# Patient Record
Sex: MALE | Race: WHITE | NOT HISPANIC OR LATINO | Employment: OTHER | URBAN - NONMETROPOLITAN AREA
[De-identification: names, ages, dates, MRNs, and addresses within clinical notes are randomized per-mention and may not be internally consistent; named-entity substitution may affect disease eponyms.]

---

## 2023-02-07 LAB
ALT SERPL-CCNC: 31 U/L (ref 10–52)
ANION GAP SERPL CALCULATED.3IONS-SCNC: 11 MMOL/L (ref 10–20)
AST SERPL-CCNC: 28 U/L (ref 9–39)
BICARBONATE: 30 MMOL/L (ref 21–32)
CALCIUM SERPL-MCNC: 9.4 MG/DL (ref 8.6–10.3)
CHLORIDE BLD-SCNC: 100 MMOL/L (ref 98–107)
CHOLESTEROL/HDL RATIO: 5.8
CHOLESTEROL: 192 MG/DL (ref 0–199)
CREAT SERPL-MCNC: 0.9 MG/DL (ref 0.5–1.3)
EGFR MALE: 89 ML/MIN/1.73M2
ERYTHROCYTE [DISTWIDTH] IN BLOOD BY AUTOMATED COUNT: 12.3 % (ref 11.5–14)
ESTIMATED AVERAGE GLUCOSE: 140 MG/DL
GLUCOSE: 130 MG/DL (ref 74–99)
HBA1C MFR BLD: 6.5 %
HCT VFR BLD CALC: 46.6 % (ref 41–52)
HDLC SERPL-MCNC: 33.1 MG/DL
HEMOGLOBIN: 16.1 G/DL (ref 13.5–17)
LDL CHOLESTEROL: 89 MG/DL (ref 0–99)
MCHC RBC AUTO-ENTMCNC: 34.5 G/DL (ref 32–36)
MCV RBC AUTO: 96 FL (ref 80–100)
NON-HDL CHOLESTEROL: 159 MG/DL
PLATELET # BLD: 205 X10E9/L (ref 150–450)
POTASSIUM SERPL-SCNC: 4.4 MMOL/L (ref 3.5–5.3)
RBC # BLD: 4.86 X10E12/L (ref 4.5–5.9)
SODIUM BLD-SCNC: 137 MMOL/L (ref 136–145)
TRIGL SERPL-MCNC: 352 MG/DL (ref 0–149)
UREA NITROGEN: 17 MG/DL (ref 6–23)
VLDLC SERPL CALC-MCNC: 70 MG/DL (ref 0–40)
WBC: 5.9 X10E9/L (ref 4.4–11.3)

## 2024-07-17 ENCOUNTER — OFFICE VISIT (OUTPATIENT)
Dept: URGENT CARE | Facility: CLINIC | Age: 74
End: 2024-07-17
Payer: MEDICARE

## 2024-07-17 VITALS
HEART RATE: 56 BPM | WEIGHT: 180 LBS | RESPIRATION RATE: 14 BRPM | TEMPERATURE: 98.7 F | HEIGHT: 70 IN | SYSTOLIC BLOOD PRESSURE: 136 MMHG | DIASTOLIC BLOOD PRESSURE: 80 MMHG | BODY MASS INDEX: 25.77 KG/M2 | OXYGEN SATURATION: 96 %
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
## 2024-07-17 DIAGNOSIS — S61.259A CAT BITE OF FINGER, INITIAL ENCOUNTER: Primary | ICD-10-CM

## 2024-07-17 PROCEDURE — 99213 OFFICE O/P EST LOW 20 MIN: CPT | Performed by: NURSE PRACTITIONER

## 2024-07-17 RX ORDER — DABIGATRAN ETEXILATE 150 MG/1
150 CAPSULE ORAL 2 TIMES DAILY
COMMUNITY
Start: 2024-07-01

## 2024-07-17 RX ORDER — PANTOPRAZOLE SODIUM 20 MG/1
20 TABLET, DELAYED RELEASE ORAL
COMMUNITY

## 2024-07-17 RX ORDER — PERPHENAZINE 2 MG
TABLET ORAL
COMMUNITY

## 2024-07-17 RX ORDER — EZETIMIBE 10 MG/1
10 TABLET ORAL
COMMUNITY
Start: 2024-07-01 | End: 2025-07-01

## 2024-07-17 RX ORDER — CARVEDILOL 6.25 MG/1
6.25 TABLET ORAL
COMMUNITY
Start: 2023-11-27

## 2024-07-17 RX ORDER — AMOXICILLIN AND CLAVULANATE POTASSIUM 875; 125 MG/1; MG/1
1 TABLET, FILM COATED ORAL 2 TIMES DAILY
Qty: 14 TABLET | Refills: 0 | Status: SHIPPED | OUTPATIENT
Start: 2024-07-17 | End: 2024-07-24

## 2024-07-17 NOTE — PROGRESS NOTES
73 y.o. male presents for evaluation of right distal tip of ring finger that occurred this morning after cleaning out barn kitten eyes and it bit him. Is up to date on tetanus. States he has noticed increased swelling, redness and pain. Denies fever or drainage. No otc meds for symptoms. No other complaints.      Vitals:    07/17/24 1422   BP: 136/80   Pulse: 56   Resp: 14   Temp: 37.1 °C (98.7 °F)   SpO2: 96%       No Known Allergies    Medication Documentation Review Audit       Reviewed by HILDA Cantu (Nurse Practitioner) on 07/17/24 at 1433      Medication Order Taking? Sig Documenting Provider Last Dose Status   carvedilol (Coreg) 6.25 mg tablet [id number] Yes Take 1 tablet (6.25 mg) by mouth. Historical Provider, MD Taking Active   dabigatran etexilate (Pradaxa) 150 mg capsule [id number] Yes Take 1 capsule (150 mg) by mouth twice a day. Historical Provider, MD Taking Active   ezetimibe (Zetia) 10 mg tablet [id number] Yes Take 1 tablet (10 mg) by mouth once daily. Historical Provider, MD Taking Active   fish,bora,flax oils-om3,6,9no1 (Omega 3-6-9) 1,200 mg capsule [id number] Yes Take by mouth once daily. Historical Provider, MD Taking Active   pantoprazole (ProtoNix) 20 mg EC tablet [id number] Yes Take 1 tablet (20 mg) by mouth once daily. Historical Provider, MD Taking Active                    Past Medical History:   Diagnosis Date    Personal history of other specified conditions     History of fatigue       Past Surgical History:   Procedure Laterality Date    OTHER SURGICAL HISTORY  12/13/2021    Complete colonoscopy    OTHER SURGICAL HISTORY  12/13/2021    Appendectomy    OTHER SURGICAL HISTORY  12/13/2021    Inguinal hernia repair       ROS  See HPI    Physical Exam  Vitals and nursing note reviewed.   Constitutional:       Appearance: Normal appearance.   Skin:     General: Skin is warm and dry.      Findings: Erythema (tip of right finger puncture wound without drainage or streaking) present.    Neurological:      General: No focal deficit present.      Mental Status: He is alert and oriented to person, place, and time.   Psychiatric:         Mood and Affect: Mood normal.         Behavior: Behavior normal.           Assessment/Plan/MDM  Raphael was seen today for animal bite.  Diagnoses and all orders for this visit:  Cat bite of finger, initial encounter (Primary)  -     amoxicillin-pot clavulanate (Augmentin) 875-125 mg tablet; Take 1 tablet by mouth 2 times a day for 7 days.      Patient's clinical presentation is otherwise unremarkable at this time. Patient is discharged with instructions to follow-up with primary care or seek emergency medical attention for worsening symptoms or any new concerns.    I did personally review Raphael's past medical history, surgical history, social history, as well as family history (when relevant).  In this case, I also oversaw the his drug management by reviewing his medication list, allergy list, as well as the medications that I prescribed during the UC course and/or recommended as an out-patient (including possible OTC medications such as acetaminophen, NSAIDs , etc).    After reviewing the items above, I did not look at previous medical documentation, such as recent hospitalizations, office visits, and/or recent consultations with PCP/specialist.                          SDOH:   Another factor that I considered in Raphael's care was his Social Determinants of Health (SDOH). During this UC encounter, he did not have social determinants of health. Those SDOH influencing Raphael's care are: none      Arturo Meadows CNP  [facility]  [phone]